# Patient Record
Sex: FEMALE | Race: WHITE | NOT HISPANIC OR LATINO | Employment: OTHER | URBAN - METROPOLITAN AREA
[De-identification: names, ages, dates, MRNs, and addresses within clinical notes are randomized per-mention and may not be internally consistent; named-entity substitution may affect disease eponyms.]

---

## 2024-08-19 ENCOUNTER — APPOINTMENT (OUTPATIENT)
Facility: HOSPITAL | Age: 74
End: 2024-08-19
Payer: MEDICARE

## 2024-08-19 ENCOUNTER — HOSPITAL ENCOUNTER (EMERGENCY)
Facility: HOSPITAL | Age: 74
Discharge: HOME | End: 2024-08-19
Attending: EMERGENCY MEDICINE | Admitting: EMERGENCY MEDICINE
Payer: MEDICARE

## 2024-08-19 VITALS
TEMPERATURE: 98.6 F | WEIGHT: 165 LBS | DIASTOLIC BLOOD PRESSURE: 68 MMHG | SYSTOLIC BLOOD PRESSURE: 152 MMHG | HEART RATE: 62 BPM | HEIGHT: 65 IN | RESPIRATION RATE: 16 BRPM | OXYGEN SATURATION: 97 % | BODY MASS INDEX: 27.49 KG/M2

## 2024-08-19 DIAGNOSIS — S01.81XA LACERATION OF FOREHEAD, INITIAL ENCOUNTER: ICD-10-CM

## 2024-08-19 DIAGNOSIS — S00.93XA CONTUSION OF HEAD, UNSPECIFIED PART OF HEAD, INITIAL ENCOUNTER: Primary | ICD-10-CM

## 2024-08-19 PROCEDURE — 70450 CT HEAD/BRAIN W/O DYE: CPT

## 2024-08-19 PROCEDURE — 99284 EMERGENCY DEPT VISIT MOD MDM: CPT

## 2024-08-19 PROCEDURE — 90471 IMMUNIZATION ADMIN: CPT

## 2024-08-19 PROCEDURE — 25010000002 TETANUS-DIPHTH-ACELL PERTUSSIS 5-2.5-18.5 LF-MCG/0.5 SUSPENSION PREFILLED SYRINGE: Performed by: EMERGENCY MEDICINE

## 2024-08-19 PROCEDURE — 90715 TDAP VACCINE 7 YRS/> IM: CPT | Performed by: EMERGENCY MEDICINE

## 2024-08-19 RX ORDER — THIAMINE HCL 100 MG
1 TABLET ORAL DAILY
COMMUNITY

## 2024-08-19 RX ORDER — LEVOCETIRIZINE DIHYDROCHLORIDE 5 MG/1
5 TABLET, FILM COATED ORAL EVERY EVENING
COMMUNITY

## 2024-08-19 RX ORDER — PRAVASTATIN SODIUM 80 MG/1
80 TABLET ORAL DAILY
COMMUNITY

## 2024-08-19 RX ADMIN — TETANUS TOXOID, REDUCED DIPHTHERIA TOXOID AND ACELLULAR PERTUSSIS VACCINE, ADSORBED 0.5 ML: 5; 2.5; 8; 8; 2.5 SUSPENSION INTRAMUSCULAR at 14:31

## 2024-08-19 NOTE — [facility] PROVIDER NOTE
"Subjective  History of Present Illness:    Patient presents with trauma to head patient states she was playing with her grandson play actually tripped fell hit her face on the ground did not lose consciousness noticed a laceration above where her glasses ribs were.  Tetanus is not up-to-date no numbness tingling weakness no anticoagulant antiplatelet use      Nurses Notes reviewed and agree, including vitals, allergies, social history and prior medical history.     REVIEW OF SYSTEMS: All systems reviewed and not pertinent unless noted.  Review of Systems    Past Medical History:   Diagnosis Date    Hyperlipidemia        Allergies:    Zithromax [azithromycin]      History reviewed. No pertinent surgical history.      Social History     Socioeconomic History    Marital status:          History reviewed. No pertinent family history.    Objective  Physical Exam:  /68 (BP Location: Left arm, Patient Position: Sitting)   Pulse 62   Temp 98.6 °F (37 °C) (Oral)   Resp 16   Ht 165.1 cm (65\")   Wt 74.8 kg (165 lb)   SpO2 97%   BMI 27.46 kg/m²      Physical Exam  Vitals and nursing note reviewed.   Constitutional:       General: She is not in acute distress.     Appearance: Normal appearance. She is not ill-appearing, toxic-appearing or diaphoretic.   HENT:      Head: Normocephalic.      Comments: Laceration to the right side of the forehead approximately 3 cm    Abrasion to the left side of the face no palpable fracture no ecchymosis     Nose: Nose normal.   Eyes:      Extraocular Movements: Extraocular movements intact.      Pupils: Pupils are equal, round, and reactive to light.   Cardiovascular:      Rate and Rhythm: Normal rate and regular rhythm.   Musculoskeletal:         General: No swelling, tenderness, deformity or signs of injury. Normal range of motion.      Cervical back: No rigidity or tenderness.   Neurological:      Mental Status: She is alert.   Psychiatric:         Mood and Affect: Mood " normal.         Behavior: Behavior normal.         Laceration Repair    Date/Time: 8/19/2024 3:39 PM    Performed by: Fredy Anguiano DO  Authorized by: Fredy Anguiano DO    Consent:     Consent obtained:  Verbal    Consent given by:  Patient  Laceration details:     Length (cm):  3  Treatment:     Area cleansed with:  Saline  Skin repair:     Repair method:  Sutures    Suture size:  6-0    Suture material:  Nylon    Suture technique:  Simple interrupted    Number of sutures:  6  Approximation:     Approximation:  Close  Repair type:     Repair type:  Simple  Post-procedure details:     Dressing:  Open (no dressing)    Procedure completion:  Tolerated      ED Course:         Lab Results (last 24 hours)       ** No results found for the last 24 hours. **             CT Head Without Contrast    Result Date: 8/19/2024  CT HEAD WO CONTRAST Date of Exam: 8/19/2024 2:13 PM EDT Indication: trauma to head. Comparison: None available. Technique: Axial CT images were obtained of the head without contrast administration.  Automated exposure control and iterative construction methods were used. Findings: There is no evidence of hemorrhage. There is no mass effect or midline shift. Periventricular hypodensities compatible with chronic small vessel ischemia There is no extracerebral collection. Ventricles are normal in size and configuration for patient's stated age. Posterior fossa is within normal limits. Calvarium and skull base appear intact.  Visualized sinuses show no air fluid levels. Visualized orbits are unremarkable. There is a left frontal scalp laceration with soft tissue swelling     Impression: Impression: No acute intracranial abnormality Electronically Signed: Salvador Martinez MD  8/19/2024 2:24 PM EDT  Workstation ID: [id number]        MDM      *Patient presenting after head injury otherwise well-appearing no acute distress normal vital signs normal neurological evaluation CT scan of the head was negative for any  acute bleed laceration was repaired patient tolerated well tetanus is updated discharged return precautions given      Data interpreted: Nursing notes reviewed, vital signs reviewed.  Labs independently interpreted by me (CBC, CMP, lipase, UA, troponin, ABG, lactic acid, procalcitonin).  Imaging independently interpreted by me (x-ray, CT scan).  EKG independently interpreted by me.  O2 saturation:    Counseling: Discussed the results above with the patient regarding need for admission or discharge.  Patient understands and agrees plan of care.      -----  ED Disposition       ED Disposition   Discharge    Condition   Stable    Comment   --             Final diagnoses:   Contusion of head, unspecified part of head, initial encounter   Laceration of forehead, initial encounter      Your Follow-Up Providers       Provider, No Known.    [place] [id number]                       Contact information for after-discharge care    Follow-up information has not been specified.                    Your medication list        ASK your doctor about these medications        Instructions Last Dose Given Next Dose Due   Calcium Citrate-Vitamin D3 315-6.25 MG-MCG tablet tablet  Commonly known as: CITRACAL      Take 1 tablet by mouth Daily.       levocetirizine 5 MG tablet  Commonly known as: XYZAL      Take 1 tablet by mouth Every Evening.       PAXLOVID PO      Take 3 tablets by mouth 2 (Two) Times a Day.       pravastatin 80 MG tablet  Commonly known as: PRAVACHOL      Take 1 tablet by mouth Daily.